# Patient Record
Sex: FEMALE | Race: WHITE | NOT HISPANIC OR LATINO | URBAN - METROPOLITAN AREA
[De-identification: names, ages, dates, MRNs, and addresses within clinical notes are randomized per-mention and may not be internally consistent; named-entity substitution may affect disease eponyms.]

---
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
## 2025-01-16 ENCOUNTER — APPOINTMENT (OUTPATIENT)
Dept: PEDIATRIC ENDOCRINOLOGY | Facility: CLINIC | Age: 20
End: 2025-01-16
Payer: COMMERCIAL

## 2025-01-16 VITALS
HEART RATE: 67 BPM | HEIGHT: 65.04 IN | SYSTOLIC BLOOD PRESSURE: 132 MMHG | BODY MASS INDEX: 26.66 KG/M2 | DIASTOLIC BLOOD PRESSURE: 86 MMHG | WEIGHT: 160.04 LBS

## 2025-01-16 VITALS — DIASTOLIC BLOOD PRESSURE: 80 MMHG | SYSTOLIC BLOOD PRESSURE: 115 MMHG

## 2025-01-16 DIAGNOSIS — R10.9 UNSPECIFIED ABDOMINAL PAIN: ICD-10-CM

## 2025-01-16 DIAGNOSIS — E55.9 VITAMIN D DEFICIENCY, UNSPECIFIED: ICD-10-CM

## 2025-01-16 DIAGNOSIS — E66.3 OVERWEIGHT: ICD-10-CM

## 2025-01-16 DIAGNOSIS — E03.1 CONGENITAL HYPOTHYROIDISM W/OUT GOITER: ICD-10-CM

## 2025-01-16 PROCEDURE — 99215 OFFICE O/P EST HI 40 MIN: CPT

## 2025-01-17 LAB
25(OH)D3 SERPL-MCNC: 23 NG/ML
ALBUMIN SERPL ELPH-MCNC: 4.6 G/DL
ALP BLD-CCNC: 82 U/L
ALT SERPL-CCNC: 8 U/L
ANION GAP SERPL CALC-SCNC: 14 MMOL/L
AST SERPL-CCNC: 12 U/L
BASOPHILS # BLD AUTO: 0.03 K/UL
BASOPHILS NFR BLD AUTO: 0.5 %
BILIRUB SERPL-MCNC: 0.7 MG/DL
BUN SERPL-MCNC: 11 MG/DL
CALCIUM SERPL-MCNC: 9.8 MG/DL
CHLORIDE SERPL-SCNC: 103 MMOL/L
CHOLEST SERPL-MCNC: 149 MG/DL
CO2 SERPL-SCNC: 21 MMOL/L
CREAT SERPL-MCNC: 1 MG/DL
EGFR: 83 ML/MIN/1.73M2
EOSINOPHIL # BLD AUTO: 0.1 K/UL
EOSINOPHIL NFR BLD AUTO: 1.6 %
ESTIMATED AVERAGE GLUCOSE: 103 MG/DL
GLUCOSE SERPL-MCNC: 85 MG/DL
HBA1C MFR BLD HPLC: 5.2 %
HCT VFR BLD CALC: 37.6 %
HDLC SERPL-MCNC: 53 MG/DL
HGB BLD-MCNC: 11.9 G/DL
IMM GRANULOCYTES NFR BLD AUTO: 0.2 %
LDLC SERPL CALC-MCNC: 85 MG/DL
LYMPHOCYTES # BLD AUTO: 1.91 K/UL
LYMPHOCYTES NFR BLD AUTO: 30.3 %
MAN DIFF?: NORMAL
MCHC RBC-ENTMCNC: 27.7 PG
MCHC RBC-ENTMCNC: 31.6 G/DL
MCV RBC AUTO: 87.4 FL
MONOCYTES # BLD AUTO: 0.48 K/UL
MONOCYTES NFR BLD AUTO: 7.6 %
NEUTROPHILS # BLD AUTO: 3.78 K/UL
NEUTROPHILS NFR BLD AUTO: 59.8 %
NONHDLC SERPL-MCNC: 96 MG/DL
PLATELET # BLD AUTO: 297 K/UL
PMV BLD AUTO: 0 /100 WBCS
POTASSIUM SERPL-SCNC: 4.2 MMOL/L
PROT SERPL-MCNC: 7.3 G/DL
RBC # BLD: 4.3 M/UL
RBC # FLD: 13.5 %
SODIUM SERPL-SCNC: 138 MMOL/L
T4 FREE SERPL-MCNC: 1.8 NG/DL
TRIGL SERPL-MCNC: 49 MG/DL
TSH SERPL-ACNC: 2.1 UIU/ML
WBC # FLD AUTO: 6.31 K/UL

## 2025-04-11 ENCOUNTER — OFFICE VISIT (OUTPATIENT)
Dept: URGENT CARE | Facility: CLINIC | Age: 20
End: 2025-04-11
Payer: COMMERCIAL

## 2025-04-11 VITALS
WEIGHT: 142 LBS | SYSTOLIC BLOOD PRESSURE: 128 MMHG | HEART RATE: 70 BPM | DIASTOLIC BLOOD PRESSURE: 80 MMHG | OXYGEN SATURATION: 99 % | TEMPERATURE: 99 F | RESPIRATION RATE: 18 BRPM

## 2025-04-11 DIAGNOSIS — S05.01XA ABRASION OF RIGHT CORNEA, INITIAL ENCOUNTER: Primary | ICD-10-CM

## 2025-04-11 RX ORDER — LEVOTHYROXINE SODIUM 25 UG/1
TABLET ORAL
COMMUNITY

## 2025-04-11 RX ORDER — PROPARACAINE HYDROCHLORIDE 5 MG/ML
1 SOLUTION/ DROPS OPHTHALMIC
Status: COMPLETED | OUTPATIENT
Start: 2025-04-11 | End: 2025-04-11

## 2025-04-11 RX ORDER — ALBUTEROL SULFATE 90 UG/1
INHALANT RESPIRATORY (INHALATION)
COMMUNITY
Start: 2024-10-08

## 2025-04-11 RX ORDER — POLYMYXIN B SULFATE AND TRIMETHOPRIM 1; 10000 MG/ML; [USP'U]/ML
1 SOLUTION OPHTHALMIC EVERY 4 HOURS
Qty: 10 ML | Refills: 0 | Status: SHIPPED | OUTPATIENT
Start: 2025-04-11 | End: 2025-04-18

## 2025-04-11 RX ADMIN — PROPARACAINE HYDROCHLORIDE 1 DROP: 5 SOLUTION/ DROPS OPHTHALMIC at 06:04

## 2025-04-12 NOTE — PATIENT INSTRUCTIONS
Follow up with eye doctor as soon as you get back home  If symptoms worsen, go to the ER  Cool compresses for comfort   Avoid rubbing eye  Avoid eye makeup   Avoid bright lights
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
## 2025-04-13 ENCOUNTER — EMERGENCY (EMERGENCY)
Facility: HOSPITAL | Age: 20
LOS: 0 days | Discharge: HOME | End: 2025-04-13
Attending: EMERGENCY MEDICINE
Payer: COMMERCIAL

## 2025-04-13 VITALS
WEIGHT: 163.58 LBS | RESPIRATION RATE: 18 BRPM | SYSTOLIC BLOOD PRESSURE: 129 MMHG | DIASTOLIC BLOOD PRESSURE: 91 MMHG | OXYGEN SATURATION: 99 % | TEMPERATURE: 98 F | HEART RATE: 74 BPM
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
## 2025-04-13 DIAGNOSIS — E03.9 HYPOTHYROIDISM, UNSPECIFIED: ICD-10-CM

## 2025-04-13 DIAGNOSIS — H53.141 VISUAL DISCOMFORT, RIGHT EYE: ICD-10-CM
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
## 2025-04-13 DIAGNOSIS — S05.01XA INJURY OF CONJUNCTIVA AND CORNEAL ABRASION WITHOUT FOREIGN BODY, RIGHT EYE, INITIAL ENCOUNTER: ICD-10-CM

## 2025-04-13 PROCEDURE — 99284 EMERGENCY DEPT VISIT MOD MDM: CPT

## 2025-04-13 RX ORDER — ERYTHROMYCIN 5 MG/G
1 OINTMENT OPHTHALMIC
Qty: 1 | Refills: 0
Start: 2025-04-13 | End: 2025-04-15

## 2025-04-13 RX ORDER — MOXIFLOXACIN HYDROCHLORIDE 5 MG/ML
1 SOLUTION/ DROPS OPHTHALMIC
Qty: 1 | Refills: 0
Start: 2025-04-13 | End: 2025-04-15

## 2025-04-13 RX ORDER — HYPROMELLOSE 0.4 %
1 DROPS OPHTHALMIC (EYE)
Qty: 1 | Refills: 0
Start: 2025-04-13 | End: 2025-04-15

## 2025-04-13 RX ORDER — CHLOROQUINE PHOSPHATE
1 POWDER (GRAM) MISCELLANEOUS
Qty: 1 | Refills: 0
Start: 2025-04-13 | End: 2025-04-15

## 2025-04-13 RX ORDER — ACETAMINOPHEN 500 MG/5ML
975 LIQUID (ML) ORAL ONCE
Refills: 0 | Status: COMPLETED | OUTPATIENT
Start: 2025-04-13 | End: 2025-04-13

## 2025-04-13 RX ORDER — IBUPROFEN 200 MG
800 TABLET ORAL ONCE
Refills: 0 | Status: COMPLETED | OUTPATIENT
Start: 2025-04-13 | End: 2025-04-13

## 2025-04-13 RX ADMIN — Medication 975 MILLIGRAM(S): at 14:02

## 2025-04-13 RX ADMIN — Medication 800 MILLIGRAM(S): at 14:02
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
## 2025-04-13 NOTE — ED PROVIDER NOTE - ADDITIONAL NOTES AND INSTRUCTIONS:
Patient has an eye injury that may prevent her from being able to complete school work until this date or until she receives clearance from opthalmology.

## 2025-04-13 NOTE — ED PROVIDER NOTE - PHYSICAL EXAMINATION
General: NAD  Head: AT, NC.  Eyes: EOMI. R eye slightly swollen, closed. Not red.   Stain: R eye with opaque irregular 1x1 cm uptake.   Intraocular Pressure: L 9, R 10  Visual Acuity: R 20/70, L 20/20  Neuro: Moves all limbs spontaneously.

## 2025-04-13 NOTE — ED PROVIDER NOTE - PATIENT PORTAL LINK FT
You can access the FollowMyHealth Patient Portal offered by [facility] by registering at the following website: [email or web address]. By joining [facility]’s FollowMyHealth portal, you will also be able to view your health information using other applications (apps) compatible with our system.

## 2025-04-13 NOTE — ED PROVIDER NOTE - PROGRESS NOTE DETAILS
Violette Davis, DO: Question to myalgia already on for different patient.  They were consulted and will see the patient as they are ready coming to the acute care area.  Family aware and will wait for ophtho.  Differential diagnosis includes abrasion, ulcer, conjunctivitis. Violette Davis DO: Patient was evaluated by ophthalmology.  They dilated eye.  He thinks that is a central retinal abrasion.  He recommended several ophthalmologic solutions that were sent to patient's pharmacy.  School note given.  They will have urgent referral to eye clinic either tomorrow or Tuesday.  All questions answered, patient is ready for discharge.

## 2025-04-13 NOTE — ED PROVIDER NOTE - NSICDXPASTMEDICALHX_GEN_ALL_CORE_FT
PAST MEDICAL HISTORY:  Hypothyroidism, congenital     No pertinent past medical history      PAST MEDICAL HISTORY:  Hypothyroidism, congenital
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
## 2025-04-21 PROBLEM — Z78.9 OTHER SPECIFIED HEALTH STATUS: Chronic | Status: INACTIVE | Noted: 2019-05-19 | Resolved: 2025-04-20

## 2025-04-24 ENCOUNTER — APPOINTMENT (OUTPATIENT)
Dept: PEDIATRIC ENDOCRINOLOGY | Facility: CLINIC | Age: 20
End: 2025-04-24
Payer: COMMERCIAL

## 2025-04-24 VITALS
SYSTOLIC BLOOD PRESSURE: 141 MMHG | HEIGHT: 64.8 IN | DIASTOLIC BLOOD PRESSURE: 90 MMHG | WEIGHT: 159 LBS | HEART RATE: 67 BPM | BODY MASS INDEX: 26.49 KG/M2

## 2025-04-24 VITALS — SYSTOLIC BLOOD PRESSURE: 123 MMHG | DIASTOLIC BLOOD PRESSURE: 86 MMHG

## 2025-04-24 DIAGNOSIS — R10.9 UNSPECIFIED ABDOMINAL PAIN: ICD-10-CM

## 2025-04-24 DIAGNOSIS — Z83.49 FAMILY HISTORY OF OTHER ENDOCRINE, NUTRITIONAL AND METABOLIC DISEASES: ICD-10-CM

## 2025-04-24 DIAGNOSIS — E03.1 CONGENITAL HYPOTHYROIDISM W/OUT GOITER: ICD-10-CM

## 2025-04-24 DIAGNOSIS — E66.3 OVERWEIGHT: ICD-10-CM

## 2025-04-24 DIAGNOSIS — Z87.898 PERSONAL HISTORY OF OTHER SPECIFIED CONDITIONS: ICD-10-CM

## 2025-04-24 DIAGNOSIS — E55.9 VITAMIN D DEFICIENCY, UNSPECIFIED: ICD-10-CM

## 2025-04-24 PROCEDURE — 99214 OFFICE O/P EST MOD 30 MIN: CPT

## 2025-05-14 NOTE — ED PEDIATRIC TRIAGE NOTE - MEANS OF ARRIVAL
HOME CARE INSTRUCTIONS:   ---Over-the-counter cough and cold medications as needed  --- Decongestant such as Mucinex D or Claritin-D as needed for congestion  --- Start daily nasal spray such as Flonase or Nasacort  --- gargle with lightly salted water several times a day  --- May take over-the-counter Tylenol for pain and fever control  --- Plenty of rest and sleep  --- Drink lots of fluids  --- Cover  your mouth and nose when coughing  or sneezing  --- Wash your hands often    SEEK FURTHER MEDICAL ATTENTION OR GO THE EMERGENCY ROOM IF:  --- Fever persists more than 3 days, or elevated over 104°  --- You have difficulty breathing or cannot catch your breath  --- Vomiting: unable to keep liquids, food or medications on stomach  --- Symptoms do not improve after 5-7  --- You Become listless, or get a stiff neck    Advised the patient to seek immediate Emergency Medical attention if symptoms fail to improve, worsen or any concerning symptoms arise.    
ambulatory

## 2025-05-28 ENCOUNTER — APPOINTMENT (OUTPATIENT)
Dept: PEDIATRIC GASTROENTEROLOGY | Facility: CLINIC | Age: 20
End: 2025-05-28

## 2025-05-28 VITALS — HEIGHT: 64.8 IN | WEIGHT: 161.8 LBS | BODY MASS INDEX: 26.96 KG/M2

## 2025-05-28 DIAGNOSIS — R10.9 UNSPECIFIED ABDOMINAL PAIN: ICD-10-CM

## 2025-05-28 PROCEDURE — 99204 OFFICE O/P NEW MOD 45 MIN: CPT

## 2025-05-28 RX ORDER — PEPPERMINT OIL 90 MG
90 CAPSULE, DELAYED, AND EXTENDED RELEASE ORAL
Qty: 90 | Refills: 1 | Status: ACTIVE | COMMUNITY
Start: 2025-05-28 | End: 1900-01-01

## 2025-05-28 RX ORDER — LACTOBACILLUS RHAMNOSUS GG 10B CELL
CAPSULE ORAL
Qty: 30 | Refills: 2 | Status: ACTIVE | COMMUNITY
Start: 2025-05-28 | End: 1900-01-01

## 2025-08-05 ENCOUNTER — NON-APPOINTMENT (OUTPATIENT)
Age: 20
End: 2025-08-05

## 2025-08-28 ENCOUNTER — APPOINTMENT (OUTPATIENT)
Dept: PEDIATRIC ENDOCRINOLOGY | Facility: CLINIC | Age: 20
End: 2025-08-28
Payer: COMMERCIAL

## 2025-08-28 VITALS
HEIGHT: 64.8 IN | DIASTOLIC BLOOD PRESSURE: 78 MMHG | HEART RATE: 78 BPM | WEIGHT: 162.7 LBS | SYSTOLIC BLOOD PRESSURE: 134 MMHG | BODY MASS INDEX: 27.11 KG/M2

## 2025-08-28 DIAGNOSIS — E66.3 OVERWEIGHT: ICD-10-CM

## 2025-08-28 DIAGNOSIS — E03.1 CONGENITAL HYPOTHYROIDISM W/OUT GOITER: ICD-10-CM

## 2025-08-28 PROCEDURE — 99215 OFFICE O/P EST HI 40 MIN: CPT

## 2025-09-10 ENCOUNTER — APPOINTMENT (OUTPATIENT)
Dept: PEDIATRIC GASTROENTEROLOGY | Facility: CLINIC | Age: 20
End: 2025-09-10

## 2025-09-19 ENCOUNTER — NON-APPOINTMENT (OUTPATIENT)
Age: 20
End: 2025-09-19